# Patient Record
Sex: FEMALE
[De-identification: names, ages, dates, MRNs, and addresses within clinical notes are randomized per-mention and may not be internally consistent; named-entity substitution may affect disease eponyms.]

---

## 2022-08-04 ENCOUNTER — TRANSCRIPTION ENCOUNTER (OUTPATIENT)
Age: 37
End: 2022-08-04

## 2022-10-14 PROBLEM — Z00.00 ENCOUNTER FOR PREVENTIVE HEALTH EXAMINATION: Status: ACTIVE | Noted: 2022-10-14

## 2024-01-11 ENCOUNTER — APPOINTMENT (OUTPATIENT)
Dept: PEDIATRIC ALLERGY IMMUNOLOGY | Facility: CLINIC | Age: 39
End: 2024-01-11
Payer: COMMERCIAL

## 2024-01-11 VITALS
SYSTOLIC BLOOD PRESSURE: 154 MMHG | TEMPERATURE: 98.3 F | DIASTOLIC BLOOD PRESSURE: 90 MMHG | HEART RATE: 80 BPM | OXYGEN SATURATION: 98 %

## 2024-01-11 DIAGNOSIS — J20.9 ACUTE BRONCHITIS, UNSPECIFIED: ICD-10-CM

## 2024-01-11 DIAGNOSIS — J30.89 OTHER ALLERGIC RHINITIS: ICD-10-CM

## 2024-01-11 DIAGNOSIS — J30.1 ALLERGIC RHINITIS DUE TO POLLEN: ICD-10-CM

## 2024-01-11 DIAGNOSIS — J45.31 ACUTE BRONCHITIS, UNSPECIFIED: ICD-10-CM

## 2024-01-11 DIAGNOSIS — Z83.6 FAMILY HISTORY OF OTHER DISEASES OF THE RESPIRATORY SYSTEM: ICD-10-CM

## 2024-01-11 DIAGNOSIS — Z82.5 FAMILY HISTORY OF ASTHMA AND OTHER CHRONIC LOWER RESPIRATORY DISEASES: ICD-10-CM

## 2024-01-11 RX ORDER — NEBULIZER ACCESSORIES
KIT MISCELLANEOUS
Qty: 2 | Refills: 2 | Status: ACTIVE | COMMUNITY
Start: 2024-01-11 | End: 1900-01-01

## 2024-01-11 RX ORDER — LEVALBUTEROL HYDROCHLORIDE 0.63 MG/3ML
0.63 SOLUTION RESPIRATORY (INHALATION)
Qty: 2 | Refills: 11 | Status: ACTIVE | COMMUNITY
Start: 2024-01-11 | End: 1900-01-01

## 2024-01-11 RX ORDER — AZITHROMYCIN 250 MG/1
250 TABLET, FILM COATED ORAL
Qty: 1 | Refills: 1 | Status: ACTIVE | COMMUNITY
Start: 2024-01-11 | End: 1900-01-01

## 2024-01-11 NOTE — PHYSICAL EXAM
[Alert] : alert [No Acute Distress] : no acute distress [Normal Voice/Communication] : normal voice communication [Supple] : the neck was supple [Soft] : abdomen soft [Skin Intact] : skin intact  [No Rash] : no rash [No clubbing] : no clubbing [No Cyanosis] : no cyanosis [Alert, Awake, Oriented as Age-Appropriate] : alert, awake, oriented as age appropriate [de-identified] : No cough during physical exam. [de-identified] : Eyes clear. [de-identified] : Throat minimal erythema.  Nasal mucosa pink, no stuffiness, white discharge.  Tympanic membranes normal.  No sinus tenderness. [de-identified] : Chest clear, good air entry, no wheezing or crackles. [de-identified] : S1-S2 regular, no murmurs.

## 2024-01-11 NOTE — SOCIAL HISTORY
[de-identified] : House with no carpet in the bedroom, no pets, no cigarette smoke exposure, central air conditioning, forced air heating.

## 2024-01-11 NOTE — ASSESSMENT
[FreeTextEntry1] : Asthma mild persistent with exacerbation due to upper respiratory infection: Use levalbuterol HFA 2 puffs every 4 hours or levalbuterol 0.63 mg nebulizer.  Continue Arnuity 200, use 1 puff daily during acute symptoms.  If symptoms worsen, take Z-Dwayne. Spirometry was not performed due to acute symptoms.  Latest spirometry on 11/11/2019 showed mild restriction.  Spirometry will be performed next visit. Note given for work, return 1/16/2024. Allergic rhinitis (pollen, dust mites): Continue environmental control.  Continue Flonase 1 spray per nostril per day.

## 2024-01-11 NOTE — HISTORY OF PRESENT ILLNESS
[de-identified] : In office for sick visit.   and parents sick recently with bronchitis, required antibiotics to clear.  Patient complains of chest congestion, deep cough, green-tinged mucus in the last 3 days and sinus pressure today.  She had significant upper respiratory infection with bodyaches and chills from 12/26/2023.  Seen by primary care physician, tested negative for COVID-19 at home but no test done in the doctor's office.  Used symptomatic treatment.  Felt better and went to work on 1/2/24.  Able to work for a few days but stayed home today due to acute symptoms.  Drinking a lot of ginger tea and going for acupuncture, feels it helps lessen the symptoms.  Trying to get pregnant, concerned about safety of medications during pregnancy. Followed in our office for possible allergies since 7/10/2003.  Received immunotherapy with extracts of trees, grass, ragweed, weeds, dust mites, dog, mold, cats for several years until 2008.  Subsequently asthma and allergies managed with medications.  Received Pneumovax on 3/29/2006 in our office.  Current regimen includes Arnuity 200, 1 puff daily, Flonase 1 spray per nostril per day, levalbuterol HFA inhaler as needed (gets jittery from albuterol).  Has compressor nebulizer at home, but needs tubing and medication. History of rash to Ceftin and sulfa, history of palpitations with Symbicort. Other medications: Synthroid, atenolol for hypertension, vitamin D, prenatal vitamins, co-Q10, ginger, DHEA, EPA, DHA, pycnogenol, resveratrol.

## 2025-07-02 ENCOUNTER — APPOINTMENT (OUTPATIENT)
Dept: PEDIATRIC ALLERGY IMMUNOLOGY | Facility: CLINIC | Age: 40
End: 2025-07-02
Payer: COMMERCIAL

## 2025-07-02 VITALS
SYSTOLIC BLOOD PRESSURE: 147 MMHG | BODY MASS INDEX: 32.14 KG/M2 | HEART RATE: 67 BPM | DIASTOLIC BLOOD PRESSURE: 94 MMHG | HEIGHT: 66 IN | OXYGEN SATURATION: 99 % | TEMPERATURE: 97.4 F | WEIGHT: 200 LBS

## 2025-07-02 PROBLEM — J45.30 ASTHMA, MILD PERSISTENT: Status: ACTIVE | Noted: 2025-07-02

## 2025-07-02 PROCEDURE — 94010 BREATHING CAPACITY TEST: CPT

## 2025-07-02 PROCEDURE — 99214 OFFICE O/P EST MOD 30 MIN: CPT | Mod: 25

## 2025-07-02 PROCEDURE — 99204 OFFICE O/P NEW MOD 45 MIN: CPT | Mod: 25

## 2025-07-02 PROCEDURE — 95012 NITRIC OXIDE EXP GAS DETER: CPT

## 2025-08-11 ENCOUNTER — NON-APPOINTMENT (OUTPATIENT)
Age: 40
End: 2025-08-11